# Patient Record
(demographics unavailable — no encounter records)

---

## 2025-02-14 NOTE — HISTORY OF PRESENT ILLNESS
[FreeTextEntry1] : SABRINA is a 45 year old female, referred by Dr. Fidel Mercado (OBGYN), who presents for initial evaluation regarding abnormal breast imaging. The patient underwent a bilateral screening mammogram in September 2024 which identified an area of asymmetry in the inferior right breast for which a right diagnostic mammogram & US was recommended. The patient underwent the right diagnostic mammogram & US on 11/11/2024 which identified an indeterminate mass in the right breast 5:00 4 cm FN measuring 0.9 cm for which was felt to likely correspond to the mammographic finding of asymmetry, recommended for ultrasound-guided core biopsy. There is additionally a benign-appearing nodule in the right breast 6:00 4 cm FN for which a 6-month follow-up ultrasound was recommended. The patient underwent the ultrasound core biopsy of the right breast 5:00 4 CMFN mass on 11/21/2024 pathology yielded benign concordant findings, recommend a 6-month follow-up right diagnostic mammogram & US. The patient has a family history of breast cancer in her mother diagnosed at age 80.  The patient states that she does not perform her own self breast exams as they are too complicated.  Patient has no breast complaints. The patient denies any palpable abnormalities of the breast, no skin changes/dimpling, no nipple discharge bilaterally.  RO lifetime risk of 32.4%; mother with breast cancer diagnosed at age 79/80 (ER positive cancer, s/p lumpectomy, XRT, on AI).

## 2025-02-14 NOTE — PHYSICAL EXAM
[Normocephalic] : normocephalic [No Supraclavicular Adenopathy] : no supraclavicular adenopathy [Examined in the supine and seated position] : examined in the supine and seated position [No dominant masses] : no dominant masses in right breast  [No Nipple Retraction] : no left nipple retraction [No Nipple Discharge] : no left nipple discharge [No Axillary Lymphadenopathy] : no left axillary lymphadenopathy [No Edema] : no edema [No Swelling] : no swelling [No Rashes] : no rashes [No Ulceration] : no ulceration [de-identified] : left breast 5-6:00 4cmFN 0.5cm circumscribed nodule, mobile

## 2025-02-14 NOTE — PHYSICAL EXAM
[Normocephalic] : normocephalic [No Supraclavicular Adenopathy] : no supraclavicular adenopathy [Examined in the supine and seated position] : examined in the supine and seated position [No dominant masses] : no dominant masses in right breast  [No Nipple Retraction] : no left nipple retraction [No Nipple Discharge] : no left nipple discharge [No Axillary Lymphadenopathy] : no left axillary lymphadenopathy [No Edema] : no edema [No Swelling] : no swelling [No Rashes] : no rashes [No Ulceration] : no ulceration [de-identified] : left breast 5-6:00 4cmFN 0.5cm circumscribed nodule, mobile

## 2025-02-14 NOTE — ASSESSMENT
[FreeTextEntry1] : 45-year-old female, palpable mass of breast, abnormal breast imaging, dense breast tissue, at high risk for breast cancer, family history of breast cancer  #Palpable mass of breast Reviewed with the patient clinical breast exam findings of a palpable left breast 5-6:00 4cmFN 0.5cm circumscribed nodule, mobile, for which a left diagnostic mammogram & US was recommended for further evaluation.  Discussed with the patient given she is due for the right breast ultrasound for follow-up, recommend the patient proceed with a left diagnostic mammogram & bilateral breast US now for further evaluation of the palpable left breast finding and short interval follow-up of right breast nodules.  #abnormal breast imaging Reviewed with the patient the results of her bilateral screening mammogram completed in September which prompted a right diagnostic mammogram and targeted right breast US which identified an indeterminate mass in the right breast 5:00 4 cm FN for which ultrasound core biopsy was recommended.  There is additional mass in the right breast 6:00 4 cm FN, felt to be benign appearing, for which 6-month follow-up was recommended.  The patient underwent a right breast ultrasound core biopsy in November which yielded benign concordant findings for which a 6-month follow-up imaging was recommended.  #Dense breast tissue Discussed continued density on mammographic evaluation with the recommendation for screening US as supplemental given density of breasts  #At high risk breast cancer Reviewed patients lifetime RO risk of 32.4%. Discussed with the patient the implications for their lifetime risk, which is considered to be at elevated risk to develop breast cancer over the span of their lifetime and it is recommended that they undergo high risk screening surveillance to include biannual radiological screening exams with a mammogram and screening MRI. Reviewed NCCN guidelines with the patient in detail. Discussed patients high risk status and recommendations for close surveillance. Patient understands and would like to be followed closely. Discussed benefits of surveillance and well as implication of the sensitivity of breast MRI. Patient understands and agrees to go forward with MRI for high risk breast cancer screening.  The patient was amenable and would like to proceed with MRI screenings.  #Family history of breast cancer Patient with a family history of breast cancer in mother diagnosed at age 79/80.  Based upon his history, patient does not qualify for genetic testing.

## 2025-02-14 NOTE — PAST MEDICAL HISTORY
[Menstruating] : The patient is menstruating [Menarche Age ____] : age at menarche was [unfilled] [Definite ___ (Date)] : the last menstrual period was [unfilled] [Total Preg ___] : G[unfilled] [Live Births ___] : P[unfilled]  [Full Term ___] : Full Term: [unfilled] [Age At Live Birth ___] : Age at live birth: [unfilled] [History of Hormone Replacement Treatment] : has no history of hormone replacement treatment [FreeTextEntry6] : None [FreeTextEntry7] : Mirena IUD current for 3 years [FreeTextEntry8] : 3 months about 10 years ago

## 2025-02-14 NOTE — DATA REVIEWED
[FreeTextEntry1] : 9/10/2024 (R) bilateral screening mammogram: Extremely dense, there is 1 view asymmetry inferior right breast, approximately 5 CMFN diagnostic mammogram and targeted ultrasound are recommended. Scattered bilateral microcalcifications are mammographically stable. There is a biopsy clip in the posterior lateral right breast and a biopsy clip in the central lateral left breast. BI-RADS 0  11/11/2024 (R) right DX mammogram & targeted right breast US: Indeterminate mass right breast 5:00 4 cm FN corresponding to area of mammographic concern there is a 0.8 x 0.4 x 0.9 cm oval heterogeneous mass in parallel orientation with internal echogenic foci, for which ultrasound guided biopsy is recommended. Right breast 6:00 4 cm FN oval lobulated hypoechoic mass measuring 1.1 x 0.4 x 0.6 cm, probably benign, 6-month follow-up ultrasound is recommended. BI-RADS 4  11/21/2024 (R) right breast 5:00 ultrasound core BX (heart clip): Portions of cystically dilated duct with periductal fibrosis, benign concordant. Recommend 6-month follow-up right mammogram & targeted right breast US.

## 2025-03-26 NOTE — HISTORY OF PRESENT ILLNESS
[FreeTextEntry1] : SABRINA is a 45 year old female who presents for telehealth follow up regarding abnormal breast imaging. The patient underwent a bilateral screening mammogram in September 2024 which identified an area of asymmetry in the inferior right breast for which a right diagnostic mammogram & US was recommended. The patient underwent the right diagnostic mammogram & US on 11/11/2024 which identified an indeterminate mass in the right breast 5:00 4 cm FN measuring 0.9 cm for which was felt to likely correspond to the mammographic finding of asymmetry, recommended for ultrasound-guided core biopsy. There is additionally a benign-appearing nodule in the right breast 6:00 4 cm FN for which a 6-month follow-up ultrasound was recommended. The patient underwent the ultrasound core biopsy of the right breast 5:00 4 CMFN mass on 11/21/2024 pathology yielded benign concordant findings, recommend a 6-month follow-up imaging. The patient has a family history of breast cancer in her mother diagnosed at age 80.  The patient states that she does not perform her own self breast exams as they are too complicated.  Patient has no breast complaints. The patient denies any palpable abnormalities of the breast, no skin changes/dimpling, no nipple discharge bilaterally.  RO lifetime risk of 32.4%; mother with breast cancer diagnosed at age 79/80 (ER positive cancer, s/p lumpectomy, XRT, on AI).

## 2025-03-26 NOTE — REASON FOR VISIT
[Follow-Up: _____] : a [unfilled] follow-up visit [Other Location: e.g. School (Enter Location, City,State)___] : at [unfilled], at the time of the visit. [Medical Office: ([facility])___] : at the medical office located in  [Verbal consent obtained from patient] : the patient, [unfilled]

## 2025-03-26 NOTE — ASSESSMENT
[FreeTextEntry1] : 45-year-old female, palpable mass of breast, abnormal breast imaging, dense breast tissue, at high risk for breast cancer, breast nodule, family history of breast cancer  #Palpable mass of breast, #abnormal breast imaging, #at high risk for breast cancer, #dense breast tissue, #breast nodule Reviewed with the patient the results of her left diagnostic mammogram and bilateral breast US showing the area of palpable concern in the left breast 5:00 region there is a 0.8 cm cyst, reviewed additional findings showing no suspicious finding at site of prior biopsy in the right breast 5:00 4 cm FN, discussed findings of benign-appearing new nodule in the left breast 6:00 3 cm FN for which 6-month follow-up ultrasound is recommended at which point the patient will be due for her bilateral screening mammogram & US.  Reviewed with the patient her breast MRI results completed on 3/15/2025 which returned benign BI-RADS 2.  The patient states that she was able to tolerate the MRI and is amenable to proceeding with high risk screening MRIs moving forward.  The patient prefers to be seen annually instead of every 6 months for clinical breast exams.  Reviewed with the patient the recommendation to proceed with a bilateral screening mammogram & US (with targeted views of left breast 6:00 nodule) in September and follow-up at that time.  This will reset patient's clinical breast exam to be annually in September.  Discussed with the patient if imaging returns benign in September, we will plan to proceed with a high risk screening breast MRI in March.  #Family history of breast cancer Patient with a family history of breast cancer in mother diagnosed at age 79/80.  Based upon his history, patient does not qualify for genetic testing.

## 2025-03-26 NOTE — DATA REVIEWED
[FreeTextEntry1] : 9/10/2024 (R) bilateral screening mammogram: Extremely dense, there is 1 view asymmetry inferior right breast, approximately 5 CMFN diagnostic mammogram and targeted ultrasound are recommended. Scattered bilateral microcalcifications are mammographically stable. There is a biopsy clip in the posterior lateral right breast and a biopsy clip in the central lateral left breast. BI-RADS 0  11/11/2024 (R) right DX mammogram & targeted right breast US: Indeterminate mass right breast 5:00 4 cm FN corresponding to area of mammographic concern there is a 0.8 x 0.4 x 0.9 cm oval heterogeneous mass in parallel orientation with internal echogenic foci, for which ultrasound guided biopsy is recommended. Right breast 6:00 4 cm FN oval lobulated hypoechoic mass measuring 1.1 x 0.4 x 0.6 cm, probably benign, 6-month follow-up ultrasound is recommended. BI-RADS 4  11/21/2024 (R) right breast 5:00 ultrasound core BX (heart clip): Portions of cystically dilated duct with periductal fibrosis, benign concordant. Recommend 6-month follow-up right mammogram & targeted right breast US.  3/15/2025 ([facility]) left DX mammogram & bilateral breast US: Extremely dense, evaluation of left breast demonstrates clip at site of prior biopsy.  Right breast ultrasound: No suspicious finding at site of prior biopsy 5:00 4 CMFN.  Left breast US: Evaluation of left breast demonstrates multiple scattered cysts.  There is a benign-appearing nodule in the left breast 6:00 3 cm FN measuring 1.1 cm, not previously documented, meets criteria for follow-up.  Benign calcified fibroadenoma left 3:00 1 cm FN measuring 1.0 cm.  There is a simple cyst left 5:00 1 cm FN measuring 0.8 cm, corresponding to region of clinical concern.  Recommend short-term interval follow-up ultrasound for benign-appearing nodule in the left breast 6:00.  BI-RADS 3  3/15/2025 ( [facility]) bilateral breast MRI: No MRI evidence of malignancy, benign BI-RADS 2